# Patient Record
Sex: MALE | Race: BLACK OR AFRICAN AMERICAN | NOT HISPANIC OR LATINO | Employment: FULL TIME | URBAN - METROPOLITAN AREA
[De-identification: names, ages, dates, MRNs, and addresses within clinical notes are randomized per-mention and may not be internally consistent; named-entity substitution may affect disease eponyms.]

---

## 2017-04-25 ENCOUNTER — LAB VISIT (OUTPATIENT)
Dept: LAB | Facility: HOSPITAL | Age: 47
End: 2017-04-25
Attending: INTERNAL MEDICINE
Payer: COMMERCIAL

## 2017-04-25 DIAGNOSIS — Z01.810 PRE-OPERATIVE CARDIOVASCULAR EXAMINATION: Primary | ICD-10-CM

## 2017-04-25 DIAGNOSIS — Z01.810 PRE-OPERATIVE CARDIOVASCULAR EXAMINATION: ICD-10-CM

## 2017-04-25 LAB
ALBUMIN SERPL BCP-MCNC: 4.2 G/DL
ALP SERPL-CCNC: 77 U/L
ALT SERPL W/O P-5'-P-CCNC: 36 U/L
ANION GAP SERPL CALC-SCNC: 8 MMOL/L
APTT BLDCRRT: 29.3 SEC
AST SERPL-CCNC: 27 U/L
BASOPHILS # BLD AUTO: 0.01 K/UL
BASOPHILS NFR BLD: 0.2 %
BILIRUB SERPL-MCNC: 0.6 MG/DL
BUN SERPL-MCNC: 22 MG/DL
CALCIUM SERPL-MCNC: 9.9 MG/DL
CHLORIDE SERPL-SCNC: 101 MMOL/L
CO2 SERPL-SCNC: 27 MMOL/L
CREAT SERPL-MCNC: 1.7 MG/DL
DIFFERENTIAL METHOD: ABNORMAL
EOSINOPHIL # BLD AUTO: 0.5 K/UL
EOSINOPHIL NFR BLD: 8.4 %
ERYTHROCYTE [DISTWIDTH] IN BLOOD BY AUTOMATED COUNT: 11.2 %
EST. GFR  (AFRICAN AMERICAN): 55.1 ML/MIN/1.73 M^2
EST. GFR  (NON AFRICAN AMERICAN): 47.6 ML/MIN/1.73 M^2
GLUCOSE SERPL-MCNC: 89 MG/DL
HCT VFR BLD AUTO: 39.5 %
HGB BLD-MCNC: 13.4 G/DL
INR PPP: 1
LYMPHOCYTES # BLD AUTO: 2 K/UL
LYMPHOCYTES NFR BLD: 37.4 %
MCH RBC QN AUTO: 29.6 PG
MCHC RBC AUTO-ENTMCNC: 33.9 %
MCV RBC AUTO: 87 FL
MONOCYTES # BLD AUTO: 0.7 K/UL
MONOCYTES NFR BLD: 12.8 %
NEUTROPHILS # BLD AUTO: 2.2 K/UL
NEUTROPHILS NFR BLD: 41 %
PLATELET # BLD AUTO: 227 K/UL
PMV BLD AUTO: 11.2 FL
POTASSIUM SERPL-SCNC: 4.4 MMOL/L
PROT SERPL-MCNC: 8.4 G/DL
PROTHROMBIN TIME: 10.7 SEC
RBC # BLD AUTO: 4.53 M/UL
SODIUM SERPL-SCNC: 136 MMOL/L
WBC # BLD AUTO: 5.37 K/UL

## 2017-04-25 PROCEDURE — 80053 COMPREHEN METABOLIC PANEL: CPT

## 2017-04-25 PROCEDURE — 36415 COLL VENOUS BLD VENIPUNCTURE: CPT | Mod: PO

## 2017-04-25 PROCEDURE — 85610 PROTHROMBIN TIME: CPT | Mod: PO

## 2017-04-25 PROCEDURE — 85730 THROMBOPLASTIN TIME PARTIAL: CPT | Mod: PO

## 2017-04-25 PROCEDURE — 85025 COMPLETE CBC W/AUTO DIFF WBC: CPT

## 2017-04-26 ENCOUNTER — TELEPHONE (OUTPATIENT)
Dept: CARDIOLOGY | Facility: CLINIC | Age: 47
End: 2017-04-26

## 2017-04-26 RX ORDER — ASPIRIN 81 MG/1
81 TABLET ORAL DAILY
COMMUNITY

## 2017-04-26 RX ORDER — TRIAMTERENE/HYDROCHLOROTHIAZID 37.5-25 MG
1 TABLET ORAL DAILY
Refills: 2 | COMMUNITY
Start: 2017-04-21

## 2017-04-26 RX ORDER — AMLODIPINE BESYLATE 10 MG/1
10 TABLET ORAL DAILY
COMMUNITY

## 2017-04-26 RX ORDER — ATORVASTATIN CALCIUM 20 MG/1
20 TABLET, FILM COATED ORAL NIGHTLY
Refills: 2 | COMMUNITY
Start: 2017-03-31

## 2017-04-26 NOTE — TELEPHONE ENCOUNTER
Patient with Chest pain, shortness of breath and abnormal stress test.  [facility] scheduled for 5/1/17 at 1130 AM advised patient of arrival time and NPO status.  Reviewed all medications and lab results  Advised patient to avoid caffeine and caffeine products and to drink at least 4 sixteen ounce bottles of water on Sunday.  Repeated back all instructions

## 2017-05-01 ENCOUNTER — SURGERY (OUTPATIENT)
Age: 47
End: 2017-05-01

## 2017-05-01 ENCOUNTER — HOSPITAL ENCOUNTER (OUTPATIENT)
Facility: HOSPITAL | Age: 47
Discharge: HOME | End: 2017-05-01
Attending: INTERNAL MEDICINE | Admitting: INTERNAL MEDICINE
Payer: COMMERCIAL

## 2017-05-01 VITALS
WEIGHT: 220 LBS | HEART RATE: 68 BPM | BODY MASS INDEX: 30.8 KG/M2 | DIASTOLIC BLOOD PRESSURE: 82 MMHG | HEIGHT: 71 IN | SYSTOLIC BLOOD PRESSURE: 125 MMHG | RESPIRATION RATE: 16 BRPM | TEMPERATURE: 98 F | OXYGEN SATURATION: 99 %

## 2017-05-01 DIAGNOSIS — R07.9 CHEST PAIN, UNSPECIFIED TYPE: ICD-10-CM

## 2017-05-01 DIAGNOSIS — R94.39 ABNORMAL STRESS TEST: Primary | ICD-10-CM

## 2017-05-01 DIAGNOSIS — R07.9 CHEST PAIN: ICD-10-CM

## 2017-05-01 DIAGNOSIS — R06.02 SHORTNESS OF BREATH: ICD-10-CM

## 2017-05-01 PROBLEM — I42.9 CARDIOMYOPATHY: Status: ACTIVE | Noted: 2017-05-01

## 2017-05-01 PROBLEM — I10 ESSENTIAL HYPERTENSION: Status: ACTIVE | Noted: 2017-05-01

## 2017-05-01 PROBLEM — Z82.49 FH: CAD (CORONARY ARTERY DISEASE): Status: ACTIVE | Noted: 2017-05-01

## 2017-05-01 PROCEDURE — 25000003 PHARM REV CODE 250

## 2017-05-01 PROCEDURE — 93005 ELECTROCARDIOGRAM TRACING: CPT

## 2017-05-01 PROCEDURE — 93010 ELECTROCARDIOGRAM REPORT: CPT | Mod: ,,, | Performed by: INTERNAL MEDICINE

## 2017-05-01 PROCEDURE — 63600175 PHARM REV CODE 636 W HCPCS

## 2017-05-01 PROCEDURE — C1769 GUIDE WIRE: HCPCS

## 2017-05-01 PROCEDURE — 99152 MOD SED SAME PHYS/QHP 5/>YRS: CPT | Mod: ,,, | Performed by: INTERNAL MEDICINE

## 2017-05-01 PROCEDURE — 93458 L HRT ARTERY/VENTRICLE ANGIO: CPT | Mod: 26,,, | Performed by: INTERNAL MEDICINE

## 2017-05-01 RX ORDER — SODIUM CHLORIDE 9 MG/ML
INJECTION, SOLUTION INTRAVENOUS CONTINUOUS
Status: DISCONTINUED | OUTPATIENT
Start: 2017-05-01 | End: 2017-05-01 | Stop reason: HOSPADM

## 2017-05-01 RX ORDER — DIPHENHYDRAMINE HCL 50 MG
50 CAPSULE ORAL ONCE
Status: COMPLETED | OUTPATIENT
Start: 2017-05-01 | End: 2017-05-01

## 2017-05-01 RX ORDER — DIAZEPAM 5 MG/1
5 TABLET ORAL
Status: DISCONTINUED | OUTPATIENT
Start: 2017-05-01 | End: 2017-05-01 | Stop reason: HOSPADM

## 2017-05-01 RX ORDER — NAPROXEN SODIUM 220 MG/1
81 TABLET, FILM COATED ORAL ONCE
Status: COMPLETED | OUTPATIENT
Start: 2017-05-01 | End: 2017-05-01

## 2017-05-01 RX ADMIN — DIAZEPAM 5 MG: 5 TABLET ORAL at 10:05

## 2017-05-01 RX ADMIN — NAPROXEN SODIUM 81 MG: 220 TABLET, FILM COATED ORAL at 10:05

## 2017-05-01 RX ADMIN — SODIUM CHLORIDE: 9 INJECTION, SOLUTION INTRAVENOUS at 10:05

## 2017-05-01 RX ADMIN — Medication 50 MG: at 10:05

## 2017-05-01 NOTE — PLAN OF CARE
1445 DISCHARGE INST. REVIEWED AND COPY GIVEN. 1500 AMBULATED TO BATHROOM AND BACK.  DENIES DIZZINESS OR NAUSEA.  1505 IV REMOVED.  1510 DISCHARGED HOME.  TO FRONT DOORS VIA W/C

## 2017-05-01 NOTE — H&P
Admit Note  Cardiology      SUBJECTIVE:     History of Present Illness:  Patient is a 46 y.o. male presents with h/o htn etoh use exsmoker fh cad was evaluated by DR KING for shortness of breath and chest pain was found to have cardiomyopathy and an abnormal stress test showing ischemia in the lad with high risk feature and ef 46%. The patient has no further chest pain he claims he is compliant with meds and diet He is referred for heart cath to assess extent of cad,.he snores at nite and he stops breathing. He has no arrythmias clinically    Past Medical History:   Diagnosis Date    Cardiomyopathy     Cardiomyopathy 5/1/2017    Essential hypertension 5/1/2017    FH: CAD (coronary artery disease) 5/1/2017    Hyperlipidemia     Hypertension      History reviewed. No pertinent surgical history.  Family History   Problem Relation Age of Onset    Heart attack Mother     Hypertension Father      Social History   Substance Use Topics    Smoking status: Former Smoker     Types: Cigarettes    Smokeless tobacco: Former User     Quit date: 2010    Alcohol use No        Review of patient's allergies indicates:  No Known Allergies    Current Facility-Administered Medications   Medication    0.9%  NaCl infusion    diazePAM tablet 5 mg     No current facility-administered medications on file prior to encounter.      No current outpatient prescriptions on file prior to encounter.     Reviewed home meds      Review of Systems:  Constitutional: negative  Eyes: negative  Ears, nose, mouth, throat, and face: negative  Respiratory: shortness of breath  Cardiovascular: chest pain  Gastrointestinal: negative  Genitourinary:negative  Hematologic/lymphatic: negative  Musculoskeletal:negative,   Neurological: negative  Behavioral/Psych: negative  Endocrine: negative  Allergic/Immunologic: negative    OBJECTIVE:     Vital Signs (Most Recent)  Temp: 98.2 °F (36.8 °C) (05/01/17 1006)  Pulse: 86 (05/01/17 1006)  Resp: 16  (05/01/17 1006)  BP: (!) 131/91 (05/01/17 1006)  SpO2: 98 % (05/01/17 1006)    Vital Signs Range (Last 24H):  Temp:  [98.2 °F (36.8 °C)]   Pulse:  [86]   Resp:  [16]   BP: (131)/(91)   SpO2:  [98 %]     Physical Exam:  General appearance: alert, appears stated age and cooperative  Head: Normocephalic, without obvious abnormality, atraumatic  Eyes:  conjunctivae/corneas clear. PERRL, EOM's intact. .  Neck: no adenopathy, no carotid bruit, no JVD, supple, symmetrical, trachea midline and thyroid not enlarged, symmetric, no tenderness/mass/nodules  Back:  no skin lesions, erythema, or scars  Lungs:  clear to auscultation bilaterally  Chest wall: no tenderness  Heart: regular rate and rhythm, S1, S2 normal, no murmur, click, rub or gallop  Abdomen: soft, non-tender; bowel sounds normal; no masses,  no organomegaly  Extremities: extremities normal, atraumatic, no cyanosis or edema  Pulses: 2+ and symmetric  Skin: Skin color, texture, turgor normal. No rashes or lesions  Neurologic: Grossly normal    Laboratory:  Chemistry:   Lab Results   Component Value Date     04/25/2017    K 4.4 04/25/2017     04/25/2017    CO2 27 04/25/2017    BUN 22 (H) 04/25/2017    CREATININE 1.7 (H) 04/25/2017    CALCIUM 9.9 04/25/2017     Cardiac Markers: No results found for: CKTOTAL, CKMB, CKMBINDEX, TROPONINI  Cardiac Markers (Last 3): No results found for: CKTOTAL, CKMB, CKMBINDEX, TROPONINI  CBC:   Lab Results   Component Value Date    WBC 5.37 04/25/2017    HGB 13.4 (L) 04/25/2017    HCT 39.5 (L) 04/25/2017    MCV 87 04/25/2017     04/25/2017     Lipids: No results found for: CHOL, TRIG, HDL, LDLDIRECT  Coagulation:   Lab Results   Component Value Date    INR 1.0 04/25/2017    APTT 29.3 04/25/2017       Diagnostic Results:  ECG: Reviewed  X-Ray: Reviewed  US: Reviewed  CT: Reviewed  Echo: Reviewed      ASSESSMENT/PLAN:     Patient Active Problem List   Diagnosis    Abnormal stress test    Cardiomyopathy    Essential  hypertension    FH: CAD (coronary artery disease)    has strongly positive cardiolite in ald distribution with cardiomyopathy refererd for lhc,.will proceed with left radial approach.    Plan:   lhc via left radial approach.  I have explained the risks, benefits , and alternatives of the procedure in detail.the patient voices understanding and all questions have been answered.the patient agrees to proceed as planned.

## 2017-05-01 NOTE — BRIEF OP NOTE
Date: 05/01/2017  Surgeon/Physician: Neftali Hollingsworth MD  Assistants: Cristi Soler    Pre Op Diagnosis: abnormal cardiolite    Post OP Diagnosis:normal coronaries    Procedure Performed: [facility]      ANESTHESIA:RN IV SEDATION    COMPLICATION: none    Specimen / Tissue Removed: No    Estimated Blood Loss: <50 cc    Prostetics/Devices: None    Findings / Operative Note:   Normal coronaries   Abnormal anterior wall motion  Ef 50%      PLAN:  Medical therapy   reassure      Discharge Note  Short Stay      SUMMARY     Admit Date: 5/1/2017    Attending Physician: Sebastien Hollingsworth MD     Discharge Physician: Neftali Hollingsworth MD     Discharge Date: 5/1/2017    Final Diagnosis:normal coronaries     Outcome of Stay:patient tolerated procedure well no complications he was deemed stable for discharge.he has normal coronaries    Disposition: Home or Self Care    Patient Instructions:   Current Discharge Medication List      CONTINUE these medications which have NOT CHANGED    Details   amlodipine (NORVASC) 10 MG tablet Take 10 mg by mouth once daily.      aspirin (ECOTRIN) 81 MG EC tablet Take 81 mg by mouth once daily.      atorvastatin (LIPITOR) 20 MG tablet Take 20 mg by mouth every evening.  Refills: 2      ranitidine (ZANTAC) 150 MG tablet Take 150 mg by mouth 2 (two) times daily.  Refills: 2      triamterene-hydrochlorothiazide 37.5-25 mg (MAXZIDE-25) 37.5-25 mg per tablet Take 1 tablet by mouth once daily.  Refills: 2             Discharge Procedure Orders (must include Diet, Follow-up, Activity)    Discharge Procedure Orders (must include Diet, Follow-up, Activity)  Diet general     Activity as tolerated   Order Comments: As per post cath instructions     Call MD for:  temperature >100.4     Call MD for:  persistent nausea and vomiting     Call MD for:  severe uncontrolled pain     Call MD for:  difficulty breathing, headache or visual disturbances     Call MD for:  redness, tenderness, or signs of infection (pain, swelling,  redness, odor or green/yellow discharge around incision site)     Call MD for:  hives     Call MD for:  persistent dizziness or light-headedness     Call MD for:  extreme fatigue       Follow-up Information     Follow up with Tommy Ulloa MD In 1 week.    Specialty:  Cardiology    Contact information:    [address] [facility] [address]  [phone]

## 2017-05-01 NOTE — IP AVS SNAPSHOT
[facility]  [id number] [place] [address] [address] [place] [place] [id number]           Patient Discharge Instructions   Our goal is to set you up for success. This packet includes information on your condition, medications, and your home care.  It will help you care for yourself to prevent having to return to the hospital.     Please ask your nurse if you have any questions.      There are many details to remember when preparing to leave the hospital. Here is what you will need to do:    1. Take your medicine. If you are prescribed medications, review your Medication List on the following pages. You may have new medications to  at the pharmacy and others that you'll need to stop taking. Review the instructions for how and when to take your medications. Talk with your doctor or nurses if you are unsure of what to do.     2. Go to your follow-up appointments. Specific follow-up information is listed in the following pages. Your may be contacted by a nurse or clinical provider about future appointments. Be sure we have all of the phone numbers to reach you. Please contact your provider's office if you are unable to make an appointment.     3. Watch for warning signs. Your doctor or nurse will give you detailed warning signs to watch for and when to call for assistance. These instructions may also include educational information about your condition. If you experience any of warning signs to your health, call your doctor.           [facility]  Unless otherwise directed by your provider, please   contact [facility], our nurse care line   that is available for 24/7 assistance.     [phone] (toll-free)     Registered nurses in the [facility]   provide: appointment scheduling, clinical advisement, health education, and other advisory services.                  ** Verify the list of medication(s) below is accurate and up to date. Carry this with you in case of emergency. If your  medications have changed, please notify your healthcare provider.             Medication List      CONTINUE taking these medications        Additional Info                      amlodipine 10 MG tablet   Commonly known as:  NORVASC   Refills:  0   Dose:  10 mg    Instructions:  Take 10 mg by mouth once daily.     Begin Date    AM    Noon    PM    Bedtime       aspirin 81 MG EC tablet   Commonly known as:  ECOTRIN   Refills:  0   Dose:  81 mg    Instructions:  Take 81 mg by mouth once daily.     Begin Date    AM    Noon    PM    Bedtime       atorvastatin 20 MG tablet   Commonly known as:  LIPITOR   Refills:  2   Dose:  20 mg    Instructions:  Take 20 mg by mouth every evening.     Begin Date    AM    Noon    PM    Bedtime       ranitidine 150 MG tablet   Commonly known as:  ZANTAC   Refills:  2   Dose:  150 mg    Instructions:  Take 150 mg by mouth 2 (two) times daily.     Begin Date    AM    Noon    PM    Bedtime       triamterene-hydrochlorothiazide 37.5-25 mg 37.5-25 mg per tablet   Commonly known as:  MAXZIDE-25   Refills:  2   Dose:  1 tablet    Instructions:  Take 1 tablet by mouth once daily.     Begin Date    AM    Noon    PM    Bedtime                  Please bring to all follow up appointments:    1. A copy of your discharge instructions.  2. All medicines you are currently taking in their original bottles.  3. Identification and insurance card.    Please arrive 15 minutes ahead of scheduled appointment time.    Please call 24 hours in advance if you must reschedule your appointment and/or time.        Follow-up Information     Follow up with Tommy Ulloa MD In 1 week.    Specialty:  Cardiology    Why:  TUESDAY 5-16-17 AT 1115 AM    Contact information:    [address] [facility] [id number] [phone]          Discharge Instructions     Future Orders    Activity as tolerated     Comments:    As per post cath instructions    Call MD for:  difficulty breathing, headache or visual disturbances      "Call MD for:  extreme fatigue     Call MD for:  hives     Call MD for:  persistent dizziness or light-headedness     Call MD for:  persistent nausea and vomiting     Call MD for:  redness, tenderness, or signs of infection (pain, swelling, redness, odor or green/yellow discharge around incision site)     Call MD for:  severe uncontrolled pain     Call MD for:  temperature >100.4     Diet general     Questions:    Total calories:      Fat restriction, if any:      Protein restriction, if any:      Na restriction, if any:      Fluid restriction:      Additional restrictions:          Discharge Instructions       Post-op Heart Catheterization    1. DIET: It is advisable for you to follow a diet that limits the intake of salt, sugar, saturated fats and cholesterol.     2. DRIVING: Due to sedation you received during your procedure, DO NOT drive or operate machinery for 24 hours. Avoid making critical decisions or signing legal documents until tomorrow.    3. ACTIVITY: AVOID activities that require bending of the affected arm/wrist for 3 days and submerging the site in water for 3 days. REMOVE the dressing the day after  the procedure, you may apply a bandaid to the site if you desire. You may RESUME       your normal activities or prescribed exercise program as instructed by your physician after 3 days.                                                                                                                 4. WOUND CARE: It is not unusual to have a small amount of bruising to appear at or near the puncture site. It is also common to have a tender "knot" develop beneath the skin at the puncture site of the wrist/arm. This is usually scar tissue and is not a cause for concern or alarm. This tender knot may take several weeks to fully resolve. The bruise will usually spread over several days. However, if the lump gets bigger, call your doctor immediately.    5. DISCOMFORT: For general discomfort at the puncture " "site, you may take 1 or 2 Acetaminophen (Tylenol) tablets every 4 - 6 hours as needed. (Do not take more than 4000 mg a day)    6. SIGNS AND SYMPTOMS TO REPORT:  Call your physician immediately if any of the following occur:                                            1. Loss of feeling, warmth or color to the affected arm/wrist                                                                                                          2. Mild beeding from the site                 3. Pain that is sudden, sharp or persistent in the affected arm/wrist                 4. Swelling or a change in "lump" size, increased redness or drainage at                               the puncture site                                                                               5. High fever (101 degrees or higher)    7. GO TO  THE EMERGENCY ROOM OR CALL 911 IF YOU HAVE: Chest pains or discomforts not relieved with 3 nitroglycerin doses (sublingual tablets or spray), numbness or severe pain of limb, if your limb becomes cold or discolored or if you develop uncontrolled bleeding from the puncture site (quickly apply firm, direct pressure above the site).        Primary Diagnosis     Your primary diagnosis was:  Abnormal Cardiovascular Stress Test      Admission Information     Date & Time Provider Department CSN    5/1/2017  9:39 AM Sebastien Hollingsworth MD [facility] [id number]      Care Providers     Provider Role Specialty Primary office phone    Sebastien Hollingsworth MD Attending Provider Cardiology [phone]    Sebastien Hollingsworth MD Surgeon  Cardiology [phone]      Your Vitals Were     BP Pulse Temp Resp Height Weight    110/64 77 98.2 °F (36.8 °C) (Oral) 16 5' 11" (1.803 m) 99.8 kg (220 lb)    SpO2 BMI             97% 30.68 kg/m2         Recent Lab Values     No lab values to display.      Allergies as of 5/1/2017     No Known Allergies      Advance Directives     An advance directive is a document which, in the event you are " no longer able to make decisions for yourself, tells your healthcare team what kind of treatment you do or do not want to receive, or who you would like to make those decisions for you.  If you do not currently have an advance directive, [facility] encourages you to create one.  For more information call:  [phone]), [phone] ([phone]),  or log on to [email or web address].        Smoking Cessation     If you would like to quit smoking:   You may be eligible for free services if you are a [place] resident and started smoking cigarettes before September 1, 1988.  Call the Smoking Cessation [employer] (S[employer]) toll free at (796) 070-3927 or (562) 775-6010.   Call 8-440-QUIT-NOW if you do not meet the above criteria.   Contact us via email: [email or web address]   View our website for more information: [email or web address]        Language Assistance Services     ATTENTION: Language assistance services are available, free of charge. Please call [phone].      ATENCIÓN: Si habla español, tiene a graham disposición servicios gratuitos de asistencia lingüística. Llame al [phone].     CHÚ Ý: N?u b?n nói Ti?ng Vi?t, có các d?ch v? h? tr? ngôn ng? mi?n phí dành cho b?n. G?i s? [phone].        [facility] Sign-Up     Activating your [facility] account is as easy as 1-2-3!     1) Visit [email or web address], select Sign Up Now, enter this activation code and your date of birth, then select Next.  [email or web address]  Expires: 6/15/2017  1:57 PM      2) Create a username and password to use when you visit [facility] in the future and select a security question in case you lose your password and select Next.    3) Enter your e-mail address and click Sign Up!    Additional Information  If you have questions, please e-mail [email or web address] or call [phone] to talk to our [facility] staff. Remember, [facility] is NOT to be used for urgent needs. For medical emergencies, dial 911.           [facility] complies with applicable Federal civil rights laws and does not discriminate on the basis of race, color, national origin, age, disability, or sex.